# Patient Record
(demographics unavailable — no encounter records)

---

## 2024-10-22 NOTE — PHYSICAL EXAM
[Normal S1, S2] : normal S1, S2 [No Rub] : no rub [No Gallop] : no gallop [Normal] : alert and oriented, normal memory [de-identified] : well healed sternotomy scar

## 2024-10-22 NOTE — REASON FOR VISIT
[FreeTextEntry1] : Cardiology follow-up visit for evaluation and management of valvular heart disease, status post mitral valve replacement, status post tricuspid valve repair, history of postoperative atrial flutter, status post ablation, hypertension.

## 2024-10-22 NOTE — CARDIOLOGY SUMMARY
[de-identified] : Oct 22 2024.  Sinus Bradycardia, consider left ventricular hypertrophy    [de-identified] : April 25 2023  . s/p bioprosthetic MV replacement. Mild MR. Normal LV function. Moderate LAE  [de-identified] : Nov 19 2020. Normal coronary arteries  [de-identified] : Nov 20 2020. s/p bioprosthetic MV replacement. Radha

## 2024-10-22 NOTE — HISTORY OF PRESENT ILLNESS
[FreeTextEntry1] : Since her last visit with me, she reports feeling well. She denies any major medical issues since she last saw me. She is not formally exercising but does remain active.  No cardiac complaints of chest pain or chest pressure.  No shortness of breath or dyspnea on exertion.  No palpitations.  No dizziness or lightheadedness.  No syncope.  No orthopnea.  No PND. She has occasional lower extremity edema that is mild (she is on amlodipine). Recently started on atorvastatin 10 mg 3 times per week.

## 2024-10-22 NOTE — DISCUSSION/SUMMARY
[FreeTextEntry1] : 73-year-old woman with valvular heart disease.  Status post mitral valve replacement, status post tricuspid valve repair.  History of postoperative atrial flutter, status post ablation, remains in sinus rhythm.  Hypertension. She has been asymptomatic with respect to cardiac issues. On physical examination today, the blood pressure is stable.  She is euvolemic.  No new cardiac murmurs rubs or gallops are noted. EKG sinus bradycardia, consider left ventricular hypertrophy, largely unchanged from prior. The current cardiac status appears to be stable.  Plan 1.  Current medication list is reviewed, no changes. 2.  Advised her to continue to try to maintain a more active aerobic lifestyle, moderate intensity exercise and eating a heart healthy diet. 3.  Follow-up with me in the office in 1 year. 4.  She will continue follow-up with her primary care provider, blood work is done through that office. 5.  Advised her to monitor for any cardiac symptoms and to report back to me if there are any changes or if she has any other concerns.  Cardiac issues were discussed with her and all of her questions have been answered to her satisfaction.  [EKG obtained to assist in diagnosis and management of assessed problem(s)] : EKG obtained to assist in diagnosis and management of assessed problem(s)

## 2025-02-13 NOTE — PHYSICAL EXAM
[de-identified] : Patient is WDWN, alert, and in no acute distress. Breathing is unlabored. She is grossly oriented to person, place, and time.  Left Shoulder: Inspection/ Palpation: there is tenderness over the proximal humerus. No swelling. No deformities. Range of Motion: Limited with pain. No crepitance. Strength: Limited Stability: no joint instability on provocative testing. [de-identified] : ACC: [id number] EXAM: XR HUMERUS MIN 2 VIEWS LT ORDERED BY: NORIS SHAVER  ACC: [id number] EXAM: XR SHOULDER COMP MIN 2V LT ORDERED BY: NORIS SHAVER  PROCEDURE DATE: 02/11/2025    INTERPRETATION: Left shoulder and left humerus. Patient had a fall.  Left shoulder. 2 views. There is a surgical neck fracture with mild angulation deformity and some comminution of the greater tuberosity. Mild degeneration.  Left humerus. 2 views. No further fracture.  IMPRESSION: Surgical neck fracture left humerus with comminution and good alignment.  --- End of Report ---      DONNY MONTES MD; Attending Radiologist This document has been electronically signed. Feb 12 2025 12:45PM

## 2025-02-13 NOTE — ADDENDUM
[FreeTextEntry1] : I, Douglas Goldstein wrote this note acting as a scribe for Dr. Bassem Martinez on 02/13/2025.   All medical record entries made by the Scribe were at my, Dr. Bassem Martinez M.D., direction and personally dictated by me on 02/13/2025. I have personally reviewed the chart and agree that the record accurately reflects my personal performance of the history, physical exam, assessment and plan

## 2025-02-13 NOTE — HISTORY OF PRESENT ILLNESS
[de-identified] :  SY MCKENZIE is a 74 year old female presenting to the office for an initial evaluation of a left shoulder fracture sustained on 02/11, when she fell off the sidewalk during a walk. She received x-rays at [facility] on 02/11/2024, which confirmed a humeral neck fracture. The patient was provided a sling and was advised to follow-up with an orthopedist. She uses Tylenol for pain relief.

## 2025-02-13 NOTE — DISCUSSION/SUMMARY
[de-identified] : The underlying pathophysiology was reviewed with the patient. XR films were reviewed with the patient. Discussed at length the nature of the patient's condition. The left shoulder symptoms appear secondary to a surgical neck fracture left humerus with comminution and good alignment. The patient and I discussed her options regarding treatment.  The patient can continue to utilize a shoulder sling as needed for support. However, I do want her to practice gentle ROM as tolerated. She should actively use her elbow, wrist, and hand as tolerated.  Activity modification was discussed in great detail. Patient was advised to take OTC medications and topical analgesic for pain management. Patient is advised to take calcium citrate, vitamin D, and vitamin C to promote bone healing.  All questions answered, understanding verbalized. Patient in agreement with plan of care. The patient can follow-up in 2 weeks. If the patient begins to experience any changes or severe exacerbation of her symptoms, she should reach out to me as soon as possible.

## 2025-03-04 NOTE — DISCUSSION/SUMMARY
[de-identified] : The underlying pathophysiology was reviewed with the patient. XR films were reviewed with the patient. Discussed at length the nature of the patient's condition. The left shoulder symptoms appear secondary to a surgical neck fracture left humerus with comminution and good alignment. The patient and I discussed her options regarding treatment.  The patient can continue to utilize a shoulder sling as needed for support. However, I do want her to practice gentle ROM as tolerated. She should actively use her elbow, wrist, and hand as tolerated.  Activity modification was discussed in great detail. Patient was advised to take OTC medications and topical analgesic for pain management. Patient is advised to take calcium citrate, vitamin D, and vitamin C to promote bone healing.  All questions answered, understanding verbalized. Patient in agreement with plan of care. The patient can follow-up in 3 weeks. If the patient begins to experience any changes or severe exacerbation of her symptoms, she should reach out to me as soon as possible.

## 2025-03-04 NOTE — HISTORY OF PRESENT ILLNESS
[de-identified] :  SY MCKENZIE is a 74 year old female presenting to the office for an initial evaluation of a left shoulder fracture sustained on 02/11, when she fell off the sidewalk during a walk. She received x-rays at [facility] on 02/11/2024, which confirmed a humeral neck fracture. The patient was provided a sling and was advised to follow-up with an orthopedist. She uses Tylenol for pain relief.  She presents for followup today 3/4/25 for repeat xrays. She reports partial improvement in the pain level.

## 2025-03-04 NOTE — PHYSICAL EXAM
[de-identified] : Patient is WDWN, alert, and in no acute distress. Breathing is unlabored. She is grossly oriented to person, place, and time.  Left Shoulder: Inspection/ Palpation: there is tenderness over the proximal humerus. No swelling. No deformities. Range of Motion: Limited with pain. No crepitance. Strength: Limited Stability: no joint instability on provocative testing. [de-identified] :  Left shoulder. 2 views done today: There is a surgical neck fracture with mild angulation deformity and some comminution of the greater tuberosity. Mild degeneration. No change from the last Xray.

## 2025-03-25 NOTE — DISCUSSION/SUMMARY
[de-identified] : The underlying pathophysiology was reviewed with the patient. XR films were reviewed with the patient. Discussed at length the nature of the patient's condition. The left shoulder symptoms appear secondary to a surgical neck fracture left humerus with comminution and good alignment. The patient and I discussed her options regarding treatment.  The patient can continue to utilize a shoulder sling as needed for support. However, I do want her to practice gentle ROM as tolerated. She should actively use her elbow, wrist, and hand as tolerated.  Activity modification was discussed in great detail. Gentle range of motion, stretching, and strengthening exercises were encouraged. Patient was advised to take OTC medications and topical analgesic for pain management. Patient is advised to take calcium citrate, vitamin D, and vitamin C to promote bone healing.  The patient wishes to begin physical therapy. I want the patient to focus on strengthening and improving her ROM. A script was given.  All questions answered, understanding verbalized. Patient in agreement with plan of care. The patient can follow-up in 6 weeks with repeat x rays.  If the patient begins to experience any changes or severe exacerbation of her symptoms, she should reach out to me as soon as possible.

## 2025-03-25 NOTE — PHYSICAL EXAM
[de-identified] : Patient is WDWN, alert, and in no acute distress. Breathing is unlabored. She is grossly oriented to person, place, and time.  Left Shoulder: Inspection/ Palpation: there is tenderness over the proximal humerus. Slight swelling. Slight discoloration. No deformities Range of Motion: Limited with pain. No crepitance. Strength: Limited Stability: no joint instability on provocative testing. [de-identified] : Left shoulder. 2 views done today: There is a surgical neck fracture with mild angulation deformity and some comminution of the greater tuberosity. Mild degeneration. No change from the last Xray. Fracture Well aligned and healing well.

## 2025-03-25 NOTE — HISTORY OF PRESENT ILLNESS
[de-identified] :  SY MCKENZIE is a 74 year old female presenting to the office for an initial evaluation of a left shoulder fracture sustained on 02/11, when she fell off the sidewalk during a walk. She received x-rays at [facility] on 02/11/2024, which confirmed a humeral neck fracture. The patient was provided a sling and was advised to follow-up with an orthopedist. She uses Tylenol for pain relief.  She presents for followup today 3/25/25 for repeat xrays. She reports she is healing well but symptoms are improving.

## 2025-03-25 NOTE — ADDENDUM
[FreeTextEntry1] : I, Prashant De Santiago wrote this note acting as a scribe for Dr. Bassem Martinez on 03/25/2025.   All medical record entries made by the Scribe were at my, Dr. Bassem Martinez MD., direction and personally dictated by me on 03/25/2025. I have personally reviewed the chart and agree that the record accurately reflects my personal performance of the history, physical exam, assessment and plan.

## 2025-05-06 NOTE — ADDENDUM
[FreeTextEntry1] : I, Prashant De Santiago wrote this note acting as a scribe for Dr. Bassem Martinez on 05/06/2025.   All medical record entries made by the Scribe were at my, Dr. Bassem Martinez MD., direction and personally dictated by me on 05/06/2025. I have personally reviewed the chart and agree that the record accurately reflects my personal performance of the history, physical exam, assessment and plan.

## 2025-05-06 NOTE — DISCUSSION/SUMMARY
[de-identified] : The underlying pathophysiology was reviewed with the patient. XR films were reviewed with the patient. Discussed at length the nature of the patient's condition. The left shoulder symptoms appear secondary to a surgical neck fracture left humerus with comminution and good alignment. The patient and I discussed her options regarding treatment.  I reassured the patient that her residual symptoms are within the nature of her fracture and has fully healed.  Gentle range of motion, stretching, and strengthening exercises were encouraged. Increase activity as tolerated. Using pulleys etc.  Patient may continue participating in all physical activities without restrictions, as tolerated.  RX: New PT script given  Patient was advised to try OTC medication and topical analgesics for pain management. I recommend that the patient utilize Tylenol, Advil, Aleve, Voltaren gel, Icy Hot, Biofreeze, Bengay, or 4% lidocaine patches.  All questions answered, understanding verbalized. Patient in agreement with plan of care. The patient can follow-up in 3 months with repeat x rays.  If the patient begins to experience any changes or severe exacerbation of her symptoms, she should reach out to me as soon as possible.

## 2025-05-06 NOTE — PHYSICAL EXAM
[de-identified] : Patient is WDWN, alert, and in no acute distress. Breathing is unlabored. She is grossly oriented to person, place, and time.  Left Shoulder: Inspection/ Palpation: there is tenderness over the proximal humerus. Slight swelling. Slight discoloration. No deformities Range of Motion: 0-90 flexion, 0-80 abduction Strength: Limited Stability: no joint instability on provocative testing. [de-identified] : Left shoulder. 2 views done today: There is a surgical neck fracture with mild angulation deformity and some comminution of the greater tuberosity. Mild degeneration. No change from the last Xray. Fracture Well aligned and fully healed.

## 2025-05-06 NOTE — HISTORY OF PRESENT ILLNESS
[de-identified] :  SY MCKENZIE is a 74 year old female presenting to the office for an initial evaluation of a left shoulder fracture sustained on 02/11, when she fell off the sidewalk during a walk. She received x-rays at [facility] on 02/11/2025, which confirmed a humeral neck fracture. The patient was provided a sling and was advised to follow-up with an orthopedist. She uses Tylenol for pain relief. She presents for followup today 3/25/25 for repeat xrays. She reports she is healing well but symptoms are improving. Today, 05/06/2025, the patient presents for a follow-up evaluation and further care. She reports forward elevation is improving but still limited.

## 2025-07-14 NOTE — HISTORY OF PRESENT ILLNESS
[FreeTextEntry1] : Shanthi is a 73 yo female with Aflutter, VHD (MVR/TVr) presenting for evaluation of exertional dyspnea. She states that she has been doing well and was generally without cardiac symptoms. She states for one day she experienced SOB that was with walking up a flight of stairs. This was unusual and caused the patient significant anxiety. No palpitations, syncope or near syncope. She states these symptoms are largely gone. She had a normal cath 2020. No recent TTE outside of 2023.

## 2025-07-14 NOTE — ASSESSMENT
[FreeTextEntry1] : This patient has significant anxiety over her cardiac health, not the least related to two separate valve surgies in short time frame.   As it has been over 2 years since her last TTE, and she had symptoms of dyspnea, it would be prudent to repeat this to make sure no changes are seen that could explain her symptoms.   I did reassure her that I feel based on her exam, abatement of symptoms, that it is unlikely to be changed, but confirmation will be needed.   She will see her primary cards after the echo.

## 2025-07-14 NOTE — CARDIOLOGY SUMMARY
[de-identified] : SB today with LVH  Oct 22 2024. Sinus Bradycardia, consider left ventricular hypertrophy  [de-identified] : April 25 2023. s/p bioprosthetic MV replacement. Mild MR. Normal LV function. Moderate LAE  [de-identified] : Nov 19 2020. Normal coronary arteries   [de-identified] : Nov 20 2020. s/p bioprosthetic MV replacement. Radha

## 2025-07-14 NOTE — PHYSICAL EXAM
[Well Developed] : well developed [Well Nourished] : well nourished [No Acute Distress] : no acute distress [Normal Conjunctiva] : normal conjunctiva [No Xanthelasma] : no xanthelasma [Normal Venous Pressure] : normal venous pressure [No Carotid Bruit] : no carotid bruit [Normal S1, S2] : normal S1, S2 [No Murmur] : no murmur [No Rub] : no rub [No Gallop] : no gallop [Clear Lung Fields] : clear lung fields [Good Air Entry] : good air entry [No Respiratory Distress] : no respiratory distress  [Soft] : abdomen soft [Non Tender] : non-tender [No Masses/organomegaly] : no masses/organomegaly [Normal Bowel Sounds] : normal bowel sounds [Normal Gait] : normal gait [No Edema] : no edema [No Cyanosis] : no cyanosis [No Clubbing] : no clubbing [No Rash] : no rash [No Skin Lesions] : no skin lesions [Moves all extremities] : moves all extremities [No Focal Deficits] : no focal deficits [Normal Speech] : normal speech [Alert and Oriented] : alert and oriented [Normal memory] : normal memory

## 2025-07-16 NOTE — ASSESSMENT
[FreeTextEntry1] : Suspect this is overflow diarrhea in a patient with known chronic constipation, managed suboptimally. I recommended the following: Start a regular bowel regimen with daily MiraLax. Advised patient she may have increased liquid stool for a while until the retained stool in the colon is appropriately cleared. She verbalized understanding Can get stool testing to evaluate for infection but this is less likely given the history. Follow up in 8 weeks. Consider colonoscopy at that time if no improvement and testing is unremarkable. She agrees.

## 2025-07-16 NOTE — PHYSICAL EXAM
[Alert] : alert [Normal Voice/Communication] : normal voice/communication [Healthy Appearing] : healthy appearing [No Acute Distress] : no acute distress [Sclera] : the sclera and conjunctiva were normal [Hearing Threshold Finger Rub Not [place]] : hearing was normal [Normal Lips/Gums] : the lips and gums were normal [Oropharynx] : the oropharynx was normal [Normal Appearance] : the appearance of the neck was normal [No Neck Mass] : no neck mass was observed [No Respiratory Distress] : no respiratory distress [No Acc Muscle Use] : no accessory muscle use [Respiration, Rhythm And Depth] : normal respiratory rhythm and effort [Auscultation Breath Sounds / Voice Sounds] : lungs were clear to auscultation bilaterally [Heart Rate And Rhythm] : heart rate was normal and rhythm regular [Normal S1, S2] : normal S1 and S2 [Murmurs] : no murmurs [Bowel Sounds] : normal bowel sounds [Abdomen Tenderness] : non-tender [No Masses] : no abdominal mass palpated [Abdomen Soft] : soft [] : no hepatosplenomegaly [No CVA Tenderness] : no CVA  tenderness [Involuntary Movements] : no involuntary movements were seen [Normal Color / Pigmentation] : normal skin color and pigmentation [No Focal Deficits] : no focal deficits [Oriented To Time, Place, And Person] : oriented to person, place, and time

## 2025-07-16 NOTE — REVIEW OF SYSTEMS
[As Noted in HPI] : as noted in HPI [Negative] : Heme/Lymph [Abdominal Pain] : no abdominal pain [Vomiting] : no vomiting [Diarrhea] : diarrhea [Heartburn] : no heartburn [Melena (black stool)] : no melena

## 2025-07-16 NOTE — REASON FOR VISIT
[Consultation] : a consultation visit [FreeTextEntry1] : New patient visit. Patient states having diarrhea for about 2 months now.

## 2025-07-16 NOTE — HISTORY OF PRESENT ILLNESS
[FreeTextEntry1] : Reports "diarrhea". Reports longstanding constipation. Dr. Odell 3 years ago. Had 3 procedures with Dr. Odell and normal colonoscopy. Denies previous EGD. Stopped iron. Denies rectal bleeding, melena.

## 2025-07-16 NOTE — PHYSICAL EXAM
[Alert] : alert [Normal Voice/Communication] : normal voice/communication [Healthy Appearing] : healthy appearing [No Acute Distress] : no acute distress [Sclera] : the sclera and conjunctiva were normal [Hearing Threshold Finger Rub Not [place]] : hearing was normal [Normal Lips/Gums] : the lips and gums were normal [Oropharynx] : the oropharynx was normal [Normal Appearance] : the appearance of the neck was normal [No Neck Mass] : no neck mass was observed [No Respiratory Distress] : no respiratory distress [No Acc Muscle Use] : no accessory muscle use [Respiration, Rhythm And Depth] : normal respiratory rhythm and effort [Auscultation Breath Sounds / Voice Sounds] : lungs were clear to auscultation bilaterally [Heart Rate And Rhythm] : heart rate was normal and rhythm regular [Normal S1, S2] : normal S1 and S2 [Murmurs] : no murmurs [Bowel Sounds] : normal bowel sounds [Abdomen Tenderness] : non-tender [No Masses] : no abdominal mass palpated [Abdomen Soft] : soft [] : no hepatosplenomegaly [No CVA Tenderness] : no CVA  tenderness [Involuntary Movements] : no involuntary movements were seen [Normal Color / Pigmentation] : normal skin color and pigmentation [No Focal Deficits] : no focal deficits [Oriented To Time, Place, And Person] : oriented to person, place, and time